# Patient Record
Sex: FEMALE | Race: BLACK OR AFRICAN AMERICAN | Employment: OTHER | URBAN - METROPOLITAN AREA
[De-identification: names, ages, dates, MRNs, and addresses within clinical notes are randomized per-mention and may not be internally consistent; named-entity substitution may affect disease eponyms.]

---

## 2022-03-30 ENCOUNTER — APPOINTMENT (OUTPATIENT)
Dept: CT IMAGING | Facility: CLINIC | Age: 72
End: 2022-03-30
Payer: MEDICARE

## 2022-03-30 ENCOUNTER — HOSPITAL ENCOUNTER (EMERGENCY)
Facility: CLINIC | Age: 72
Discharge: HOME | End: 2022-03-30
Attending: EMERGENCY MEDICINE
Payer: MEDICARE

## 2022-03-30 VITALS
DIASTOLIC BLOOD PRESSURE: 75 MMHG | OXYGEN SATURATION: 96 % | HEIGHT: 63 IN | SYSTOLIC BLOOD PRESSURE: 133 MMHG | RESPIRATION RATE: 21 BRPM | HEART RATE: 109 BPM | WEIGHT: 126 LBS | TEMPERATURE: 98.8 F | BODY MASS INDEX: 22.32 KG/M2

## 2022-03-30 DIAGNOSIS — K59.00 CONSTIPATION, UNSPECIFIED CONSTIPATION TYPE: Primary | ICD-10-CM

## 2022-03-30 LAB
ABSOLUTE EOS #: 1 K/UL (ref 0–0.4)
ABSOLUTE LYMPH #: 3 K/UL (ref 1–4.8)
ABSOLUTE MONO #: 0.4 K/UL (ref 0.1–1.2)
ALBUMIN SERPL-MCNC: 4 G/DL (ref 3.5–5.2)
ALBUMIN/GLOBULIN RATIO: 1.4 (ref 1–2.5)
ALP BLD-CCNC: 102 U/L (ref 35–104)
ALT SERPL-CCNC: 15 U/L (ref 5–33)
ANION GAP SERPL CALCULATED.3IONS-SCNC: 15 MMOL/L (ref 9–17)
AST SERPL-CCNC: 22 U/L
BASOPHILS # BLD: 1 % (ref 0–2)
BASOPHILS ABSOLUTE: 0.1 K/UL (ref 0–0.2)
BILIRUB SERPL-MCNC: 0.2 MG/DL (ref 0.3–1.2)
BILIRUBIN URINE: NEGATIVE
BUN BLDV-MCNC: 11 MG/DL (ref 8–23)
CALCIUM SERPL-MCNC: 8.3 MG/DL (ref 8.6–10.4)
CHLORIDE BLD-SCNC: 101 MMOL/L (ref 98–107)
CO2: 22 MMOL/L (ref 20–31)
COLOR: YELLOW
COMMENT UA: NORMAL
CREAT SERPL-MCNC: 1.1 MG/DL (ref 0.5–0.9)
EOSINOPHILS RELATIVE PERCENT: 18 % (ref 1–4)
GFR AFRICAN AMERICAN: 59 ML/MIN
GFR NON-AFRICAN AMERICAN: 49 ML/MIN
GFR SERPL CREATININE-BSD FRML MDRD: ABNORMAL ML/MIN/{1.73_M2}
GLUCOSE BLD-MCNC: 128 MG/DL (ref 70–99)
GLUCOSE URINE: NEGATIVE
HCT VFR BLD CALC: 35.1 % (ref 36–46)
HEMOGLOBIN: 11.6 G/DL (ref 12–16)
KETONES, URINE: NEGATIVE
LEUKOCYTE ESTERASE, URINE: NEGATIVE
LIPASE: 36 U/L (ref 13–60)
LYMPHOCYTES # BLD: 51 % (ref 24–44)
MCH RBC QN AUTO: 27.8 PG (ref 26–34)
MCHC RBC AUTO-ENTMCNC: 33 G/DL (ref 31–37)
MCV RBC AUTO: 84.2 FL (ref 80–100)
MONOCYTES # BLD: 7 % (ref 2–11)
NITRITE, URINE: NEGATIVE
PDW BLD-RTO: 13.7 % (ref 12.5–15.4)
PH UA: 7 (ref 5–8)
PLATELET # BLD: 274 K/UL (ref 140–450)
PMV BLD AUTO: 7.3 FL (ref 6–12)
POTASSIUM SERPL-SCNC: 3.7 MMOL/L (ref 3.7–5.3)
PROTEIN UA: NEGATIVE
RBC # BLD: 4.16 M/UL (ref 4–5.2)
SEG NEUTROPHILS: 23 % (ref 36–66)
SEGMENTED NEUTROPHILS ABSOLUTE COUNT: 1.3 K/UL (ref 1.8–7.7)
SODIUM BLD-SCNC: 138 MMOL/L (ref 135–144)
SPECIFIC GRAVITY UA: 1.01 (ref 1–1.03)
TOTAL PROTEIN: 6.8 G/DL (ref 6.4–8.3)
TROPONIN, HIGH SENSITIVITY: 7 NG/L (ref 0–14)
TURBIDITY: CLEAR
URINE HGB: NEGATIVE
UROBILINOGEN, URINE: NORMAL
WBC # BLD: 5.8 K/UL (ref 3.5–11)

## 2022-03-30 PROCEDURE — 85025 COMPLETE CBC W/AUTO DIFF WBC: CPT

## 2022-03-30 PROCEDURE — 99284 EMERGENCY DEPT VISIT MOD MDM: CPT

## 2022-03-30 PROCEDURE — 74177 CT ABD & PELVIS W/CONTRAST: CPT

## 2022-03-30 PROCEDURE — 81003 URINALYSIS AUTO W/O SCOPE: CPT

## 2022-03-30 PROCEDURE — 84484 ASSAY OF TROPONIN QUANT: CPT

## 2022-03-30 PROCEDURE — 36415 COLL VENOUS BLD VENIPUNCTURE: CPT

## 2022-03-30 PROCEDURE — 80053 COMPREHEN METABOLIC PANEL: CPT

## 2022-03-30 PROCEDURE — 93005 ELECTROCARDIOGRAM TRACING: CPT

## 2022-03-30 PROCEDURE — 83690 ASSAY OF LIPASE: CPT

## 2022-03-30 PROCEDURE — 70450 CT HEAD/BRAIN W/O DYE: CPT

## 2022-03-30 PROCEDURE — 6360000004 HC RX CONTRAST MEDICATION

## 2022-03-30 PROCEDURE — 2580000003 HC RX 258

## 2022-03-30 RX ORDER — ALENDRONATE SODIUM 70 MG/1
70 TABLET ORAL
COMMUNITY

## 2022-03-30 RX ORDER — POLYETHYLENE GLYCOL 3350 17 G/17G
17 POWDER, FOR SOLUTION ORAL DAILY
Qty: 510 G | Refills: 0 | Status: SHIPPED | OUTPATIENT
Start: 2022-03-30 | End: 2022-04-29

## 2022-03-30 RX ORDER — VENLAFAXINE HYDROCHLORIDE 37.5 MG/1
37.5 CAPSULE, EXTENDED RELEASE ORAL DAILY
COMMUNITY

## 2022-03-30 RX ORDER — DOCUSATE SODIUM 100 MG/1
100 CAPSULE, LIQUID FILLED ORAL 2 TIMES DAILY
Qty: 60 CAPSULE | Refills: 0 | Status: SHIPPED | OUTPATIENT
Start: 2022-03-30 | End: 2022-04-29

## 2022-03-30 RX ORDER — ESCITALOPRAM OXALATE 20 MG/1
20 TABLET ORAL DAILY
COMMUNITY
End: 2022-09-13

## 2022-03-30 RX ORDER — SODIUM CHLORIDE 0.9 % (FLUSH) 0.9 %
10 SYRINGE (ML) INJECTION PRN
Status: DISCONTINUED | OUTPATIENT
Start: 2022-03-30 | End: 2022-03-30 | Stop reason: HOSPADM

## 2022-03-30 RX ORDER — 0.9 % SODIUM CHLORIDE 0.9 %
70 INTRAVENOUS SOLUTION INTRAVENOUS ONCE
Status: COMPLETED | OUTPATIENT
Start: 2022-03-30 | End: 2022-03-30

## 2022-03-30 RX ORDER — AMITRIPTYLINE HYDROCHLORIDE 50 MG/1
50 TABLET, FILM COATED ORAL NIGHTLY
COMMUNITY

## 2022-03-30 RX ORDER — 0.9 % SODIUM CHLORIDE 0.9 %
500 INTRAVENOUS SOLUTION INTRAVENOUS ONCE
Status: COMPLETED | OUTPATIENT
Start: 2022-03-30 | End: 2022-03-30

## 2022-03-30 RX ORDER — SIMVASTATIN 20 MG
20 TABLET ORAL NIGHTLY
COMMUNITY

## 2022-03-30 RX ORDER — LANSOPRAZOLE 30 MG/1
30 CAPSULE, DELAYED RELEASE ORAL DAILY
COMMUNITY

## 2022-03-30 RX ADMIN — SODIUM CHLORIDE 70 ML: 9 INJECTION, SOLUTION INTRAVENOUS at 16:52

## 2022-03-30 RX ADMIN — Medication 10 ML: at 16:53

## 2022-03-30 RX ADMIN — IOPAMIDOL 75 ML: 755 INJECTION, SOLUTION INTRAVENOUS at 16:52

## 2022-03-30 RX ADMIN — SODIUM CHLORIDE 500 ML: 9 INJECTION, SOLUTION INTRAVENOUS at 16:05

## 2022-03-30 ASSESSMENT — PAIN DESCRIPTION - FREQUENCY: FREQUENCY: CONTINUOUS

## 2022-03-30 ASSESSMENT — PAIN DESCRIPTION - LOCATION: LOCATION: ABDOMEN

## 2022-03-30 ASSESSMENT — PAIN DESCRIPTION - PAIN TYPE: TYPE: ACUTE PAIN

## 2022-03-30 ASSESSMENT — PAIN SCALES - GENERAL: PAINLEVEL_OUTOF10: 5

## 2022-03-30 ASSESSMENT — PAIN DESCRIPTION - DESCRIPTORS: DESCRIPTORS: PRESSURE

## 2022-03-30 NOTE — ED PROVIDER NOTES
I was present in the ED during this patient's evaluation and management by the Advance Practice Provider and was available to address any concerns about their medical management.     Ap Woodard MD  Attending, Emergency Department      Farrukh Alonzo MD  03/30/22 6394

## 2022-03-30 NOTE — ED PROVIDER NOTES
[facility] ED  [address] [facility]  Phone: [phone]      eMERGENCY dEPARTMENT eNCOUnter      Pt Name: Mendoza Rousseau  MRN: [id number]  [place] 1950  Date of evaluation: 3/30/22      CHIEF COMPLAINT:  Chief Complaint   Patient presents with    Constipation    Loss of Consciousness       HISTORY OF PRESENT ILLNESS    Mendoza Rousseau is a 66 y.o. female who presents with complaints of constipation and syncopal episode. She reports that she has a history of a rectocele that causes intermittent constipation. She reports her last bowel movement was 6 days ago. She states that she has previously completed enemas at home with success and relief of constipation. Today she reports that she took a saline enema at home then went to lay down in her room to let the enema work. She states that she went into the restroom and had a small bowel movement although states that she felt like there was more stool in her rectum. She states that she went to lay down in approximately 20 minutes later got up to have another bowel movement. She states that she was straining and pushing and was unable to produce another bowel movement. She states that she started to feel abdominal cramping he got up to walk back to her bedroom to lay down when she had a syncopal episode falling and hitting her head on the wall. Her  states that he saw her laying in the hallway and helped her back to the bedroom, assisted her with getting dressed and brought her into the emergency room for an evaluation. She also reports that she was diagnosed with a urinary tract infection approximately 8 days ago at the [facility] urgent care and started on Bactrim. She states that she has 1 dose left to take although continues to feel urinary pressure. She denies any current complaints of fever, chills, nausea, vomiting, headache, neck pain, visual changes, dizziness, lightheadedness, and she denies the use of blood thinners. Nursing Notes were reviewed. REVIEW OF SYSTEMS       Constitutional: Denies recent fever, chills. Eyes: No visual changes. Neck: No neck pain. Respiratory: Denies recent shortness of breath. Cardiac:  Denies recent chest pain. GI: Complaining of constipation with lower abdominal pressure. Denies nausea or vomiting  : Denies dysuria. Musculoskeletal: Denies focal weakness. Neurologic: Denies headache or focal weakness. Skin:  Denies any rash. Negative in 10 essential Systems except as mentioned above and in the HPI. PAST MEDICAL HISTORY   PMH:  has a past medical history of Depression, Hypertension, Rectocele, and Uterine prolapse. Surgical History:  has no past surgical history on file. Social History:  reports that she has never smoked. She has never used smokeless tobacco. She reports previous alcohol use. She reports previous drug use. Family History: None  Psychiatric History: None  Allergies:is allergic to griseofulvin ultramicrosize [griseofulvin]. PHYSICAL EXAM     INITIAL VITALS: /75   Pulse 109   Temp 98.8 °F (37.1 °C) (Oral)   Resp 21   Ht 5' 3\" (1.6 m)   Wt 57.2 kg (126 lb)   SpO2 96%   BMI 22.32 kg/m²   Constitutional:  Well developed   Eyes:  Pupils equal/round  HENT:  Atraumatic, external ears normal, nose normal  Respiratory:  Clear to auscultation bilaterally with good air exchange, no W/R/R  Cardiovascular:  RRR with normal S1 and S2  Gastrointestinal/Abdomen:   Soft, suprapubic tenderness. BS present. No pulsatile masses palpated. Musculoskeletal:  Normal to inspection. Back:  No CVA tenderness. Integument:  No rash.     Neurologic:  Alert, appropriate mentation/interaction, no focal deficits noted       DIAGNOSTIC RESULTS     EKG: All EKG's are interpreted by the Emergency Department Physician who either signs or Co-signs this chart in the absence of a cardiologist.  Not indicated    RADIOLOGY:   Reviewed the radiologist:  CT ABDOMEN PELVIS W IV CONTRAST Additional Contrast? None   Final Result   The right kidney is mildly atrophic with multiple scars. There is moderate amount of fecal material in the colon suggesting   constipation. 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 CT HEAD WO CONTRAST   Final Result   No acute intracranial abnormality. CT HEAD WO CONTRAST    Result Date: 3/30/2022  EXAMINATION: CT OF THE HEAD WITHOUT CONTRAST  3/30/2022 4:45 pm TECHNIQUE: CT of the head was performed without the administration of intravenous contrast. Dose modulation, iterative reconstruction, and/or weight based adjustment of the mA/kV was utilized to reduce the radiation dose to as low as reasonably achievable. COMPARISON: None. HISTORY: ORDERING SYSTEM PROVIDED HISTORY: fall TECHNOLOGIST PROVIDED HISTORY: fall Decision Support Exception - unselect if not a suspected or confirmed emergency medical condition->Emergency Medical Condition (MA) Reason for Exam: fall, loc FINDINGS: BRAIN/VENTRICLES: There is no acute intracranial hemorrhage, mass effect or midline shift. No abnormal extra-axial fluid collection. The gray-white differentiation is maintained without evidence of an acute infarct. There is no evidence of hydrocephalus. ORBITS: The visualized portion of the orbits demonstrate no acute abnormality. SINUSES: The visualized paranasal sinuses and mastoid air cells demonstrate no acute abnormality. SOFT TISSUES/SKULL:  No acute abnormality of the visualized skull or soft tissues. No acute intracranial abnormality.      CT ABDOMEN PELVIS W IV CONTRAST Additional Contrast? None    Result Date: 3/30/2022  EXAMINATION: CT OF THE ABDOMEN AND PELVIS WITH CONTRAST 3/30/2022 4:44 pm TECHNIQUE: CT of the abdomen and pelvis was performed with the administration of intravenous contrast. Multiplanar reformatted images are provided for review. Dose modulation, iterative reconstruction, and/or weight based adjustment of the mA/kV was utilized to reduce the radiation dose to as low as reasonably achievable. COMPARISON: None. HISTORY: ORDERING SYSTEM PROVIDED HISTORY: abdominal pain/constipation TECHNOLOGIST PROVIDED HISTORY: abdominal pain/constipation Decision Support Exception - unselect if not a suspected or confirmed emergency medical condition->Emergency Medical Condition (MA) Reason for Exam: Lower abdominal pain, constipation with last BM x6 days ago, currently being treated for UTI. FINDINGS: Lower Chest: Unremarkable Organs: The liver, pancreas and spleen appear normal.  The patient is status post cholecystectomy. The left kidney appears normal.  There are scars noted in the right kidney. GI/Bowel: The stomach and small bowel loops appear normal.  There is moderate amount of fecal material in the colon, suggesting constipation. There is no bowel distension. Pelvis: Patient is status post hysterectomy. The bladder and the rectum appear normal.  There is evidence of chronic occlusion of the left external and common iliac veins, with associated varicosities noted along the anterior abdominal wall. Peritoneum/Retroperitoneum: Unremarkable Bones/Soft Tissues: Unremarkable     The right kidney is mildly atrophic with multiple scars. There is moderate amount of fecal material in the colon suggesting constipation. 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      LABS:  Labs Reviewed   CBC WITH AUTO DIFFERENTIAL - Abnormal; Notable for the following components:       Result Value    Hemoglobin 11.6 (*)     Hematocrit 35.1 (*)     Seg Neutrophils 23 (*)     Lymphocytes 51 (*)     Eosinophils % 18 (*)     Segs Absolute 1.30 (*)     Absolute Eos # 1.00 (*)     All other components within normal limits   COMPREHENSIVE METABOLIC PANEL - Abnormal; Notable for the following components:    Glucose 128 (*)     CREATININE 1.10 (*)     Calcium 8.3 (*)     Total Bilirubin 0.20 (*)     GFR Non- 49 (*)     GFR  59 (*)     All other components within normal limits   LIPASE   URINALYSIS WITH REFLEX TO CULTURE   TROPONIN     EMERGENCY DEPARTMENT COURSE:     Upon evaluation patient is resting on stretcher in no acute distress. Patient is alert and oriented. Plan is to obtain IV, administer IV fluids, labs to include CBC, CMP, lipase, troponin, EKG, CT head, and CT abdomen with IV contrast.     CBC, CMP, lipase are all stable and within acceptable limits. Troponin within normal limits. CT head without intracranial abnormality. Patient was up ambulatory to the restroom to obtain urinalysis. She denied any complaints of dizziness, lightheadedness or feelings of passing out. Patient with steady gait ambulatory back to treatment room. CT abdomen pelvis reveals the right kidney is mildly atrophic with multiple scars. There is moderate amount of fecal material in the colon suggesting constipation. 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 Discussed CT results with patient and she is in agreement with receiving a fleets enema here with magnesium citrate. Urinalysis within normal limits. Into discussed urinalysis results with patient and she is currently declining fleets enema or magnesium citrate as previously disscussed. We did discuss an alternative plan to take Metamucil every morning, Colace twice daily, and MiraLAX in the evening. She verbalized understanding and was in agreement with this plan. We did discuss returning to the emergency room further for any increase of abdominal pain, inability to pass gas or stool or development of fever.      The patient presents with abdominal pain without signs of peritonitis or other life-threatening or serious etiology. Re-evaluation of the abdomen is negative for guarding, peritoneal signs or significant tenderness noted. The patient appears stable for discharge and has been instructed to follow up with their primary care physician as soon as possible or to return immediately if the symptoms worsen in any way. The patient understands that at this time there is no evidence for a more malignant underlying process, but the patient also understands that early in the process of an illness or injury, an emergency department workup can be falsely reassuring. Routine discharge counseling was given, and the patient understands that worsening, changing or persistent symptoms should prompt an immediate call or follow up with their primary physician or return to the emergency department. The importance of appropriate follow up was also discussed. I have reviewed the disposition diagnosis with the patient and or their family/guardian. I have answered their questions and given discharge instructions. They voiced understanding of these instructions and did not have any further questions or complaints. Orders Placed This Encounter   Medications    0.9 % sodium chloride bolus    0.9 % sodium chloride bolus    sodium chloride flush 0.9 % injection 10 mL    iopamidol (ISOVUE-370) 76 % injection 75 mL    magnesium citrate solution 296 mL    polyethylene glycol (GLYCOLAX) 17 GM/SCOOP powder     Sig: Take 17 g by mouth daily     Dispense:  510 g     Refill:  0    docusate sodium (COLACE) 100 MG capsule     Sig: Take 1 capsule by mouth 2 times daily     Dispense:  60 capsule     Refill:  0       CONSULTS:  None      FINAL IMPRESSION      1.  Constipation, unspecified constipation type          DISPOSITION/PLAN:  DISPOSITION          PATIENT REFERRED TO:  Bienvenido Faye MD  [facility]  [address]  [phone]    Call in 2 days      [facility]  [address] [place] [address],[facility] [address]  [phone]    As needed      DISCHARGE MEDICATIONS:  New Prescriptions    DOCUSATE SODIUM (COLACE) 100 MG CAPSULE    Take 1 capsule by mouth 2 times daily    POLYETHYLENE GLYCOL (GLYCOLAX) 17 GM/SCOOP POWDER    Take 17 g by mouth daily       (Please note that portions of this note were completed with a voice recognition program.  Efforts were made to edit the dictations but occasionally words are mis-transcribed.)    ELIO Murray CNP, APRN - CNP  03/30/22 1320

## 2022-03-30 NOTE — ED NOTES
Pt presented to the ED c/o constipation and syncope. Pt states she has a Hx of rectocele and states she has not has a BM for about 6 days. Pt states she did an enema at home, and states she was able to get some stool out, but not all of it. Pt states she may have been straining when she had LOC. Pt states she hit the left side of her forehead on the area rug. Denies head and neck pain, denies visual changes, denies being on blood thinner. Pt is unsure how long she had LOC for. Pt states she is feeling weak and shaky at the moment. Reports she is having lower abdominal pressure, states she is being treated for a UTI with Bactrim and has one and a half days of pills left.       Mignon Hays RN  03/30/22 1600

## 2022-03-30 NOTE — ED NOTES
Pt up to bathroom with a steady gait, denies dizziness, urine specimen received, Canales April NP updated, continue to monitor     Gus Santana RN  03/30/22 7090

## 2022-03-31 LAB
EKG ATRIAL RATE: 109 BPM
EKG P AXIS: 60 DEGREES
EKG P-R INTERVAL: 154 MS
EKG Q-T INTERVAL: 352 MS
EKG QRS DURATION: 68 MS
EKG QTC CALCULATION (BAZETT): 474 MS
EKG R AXIS: 28 DEGREES
EKG T AXIS: 72 DEGREES
EKG VENTRICULAR RATE: 109 BPM

## 2022-06-06 ENCOUNTER — HOSPITAL ENCOUNTER (EMERGENCY)
Facility: CLINIC | Age: 72
Discharge: HOME | End: 2022-06-06
Attending: EMERGENCY MEDICINE
Payer: MEDICARE

## 2022-06-06 ENCOUNTER — APPOINTMENT (OUTPATIENT)
Dept: GENERAL RADIOLOGY | Facility: CLINIC | Age: 72
End: 2022-06-06
Payer: MEDICARE

## 2022-06-06 VITALS
DIASTOLIC BLOOD PRESSURE: 66 MMHG | WEIGHT: 127 LBS | HEIGHT: 63 IN | TEMPERATURE: 98.5 F | OXYGEN SATURATION: 97 % | SYSTOLIC BLOOD PRESSURE: 124 MMHG | HEART RATE: 72 BPM | BODY MASS INDEX: 22.5 KG/M2 | RESPIRATION RATE: 16 BRPM

## 2022-06-06 DIAGNOSIS — K59.00 CONSTIPATION, UNSPECIFIED CONSTIPATION TYPE: Primary | ICD-10-CM

## 2022-06-06 DIAGNOSIS — N81.6 RECTOCELE: ICD-10-CM

## 2022-06-06 LAB
-: ABNORMAL
BACTERIA: ABNORMAL
BILIRUBIN URINE: NEGATIVE
COLOR: YELLOW
EPITHELIAL CELLS UA: ABNORMAL /HPF (ref 0–5)
GLUCOSE URINE: NEGATIVE
KETONES, URINE: NEGATIVE
LEUKOCYTE ESTERASE, URINE: ABNORMAL
NITRITE, URINE: NEGATIVE
OTHER OBSERVATIONS UA: ABNORMAL
PH UA: 6 (ref 5–8)
PROTEIN UA: NEGATIVE
RBC UA: ABNORMAL /HPF (ref 0–2)
SPECIFIC GRAVITY UA: 1 (ref 1–1.03)
TURBIDITY: CLEAR
URINE HGB: NEGATIVE
UROBILINOGEN, URINE: NORMAL
WBC UA: ABNORMAL /HPF (ref 0–5)

## 2022-06-06 PROCEDURE — 6370000000 HC RX 637 (ALT 250 FOR IP): Performed by: EMERGENCY MEDICINE

## 2022-06-06 PROCEDURE — 81001 URINALYSIS AUTO W/SCOPE: CPT

## 2022-06-06 PROCEDURE — 99284 EMERGENCY DEPT VISIT MOD MDM: CPT

## 2022-06-06 PROCEDURE — 74022 RADEX COMPL AQT ABD SERIES: CPT

## 2022-06-06 RX ORDER — ONDANSETRON 2 MG/ML
4 INJECTION INTRAMUSCULAR; INTRAVENOUS ONCE
Status: DISCONTINUED | OUTPATIENT
Start: 2022-06-06 | End: 2022-06-06

## 2022-06-06 RX ORDER — 0.9 % SODIUM CHLORIDE 0.9 %
1000 INTRAVENOUS SOLUTION INTRAVENOUS ONCE
Status: DISCONTINUED | OUTPATIENT
Start: 2022-06-06 | End: 2022-06-06

## 2022-06-06 RX ORDER — KETOROLAC TROMETHAMINE 15 MG/ML
15 INJECTION, SOLUTION INTRAMUSCULAR; INTRAVENOUS ONCE
Status: DISCONTINUED | OUTPATIENT
Start: 2022-06-06 | End: 2022-06-06

## 2022-06-06 RX ADMIN — MAGNESIUM CITRATE 296 ML: 1.75 LIQUID ORAL at 23:06

## 2022-06-06 ASSESSMENT — PAIN - FUNCTIONAL ASSESSMENT: PAIN_FUNCTIONAL_ASSESSMENT: 0-10

## 2022-06-06 ASSESSMENT — PAIN SCALES - GENERAL: PAINLEVEL_OUTOF10: 7

## 2022-06-06 ASSESSMENT — PAIN DESCRIPTION - ORIENTATION: ORIENTATION: RIGHT;LEFT

## 2022-06-06 ASSESSMENT — PAIN DESCRIPTION - LOCATION: LOCATION: GROIN

## 2022-06-06 ASSESSMENT — PAIN DESCRIPTION - DESCRIPTORS: DESCRIPTORS: PRESSURE

## 2022-06-07 NOTE — ED NOTES
Pt presents to ED ambulatory a&o x4. Pt comes with complaints of burning with urination and constipation. Pt states she had a small BM today and now is only able to urinate small amounts at a time.       Angela Graff RN  06/06/22 1275

## 2022-06-07 NOTE — ED PROVIDER NOTES
[facility]  [address] [facility]  Phone: [phone]      Pt Name: Sugar Strickland  ATS:[id number]  [place] 1950  Date of evaluation: 6/6/2022      CHIEF COMPLAINT       Chief Complaint   Patient presents with    Dysuria       HISTORY OF PRESENT ILLNESS   Sugar Strickland is a 67 y.o. female who presents for evaluation of dysuria. She reports that she has history of a rectocele and chronic constipation. She states that starting tonight she developed gradual onset, constant, progressive, suprapubic pressure and is unsure if it is from her rectocele or from constipation. She normally takes MiraLAX, laxative, and fiber daily to help with constipation but has not taken this for the past 4 days because she wanted to see if she could wean herself off of it. The patient states that starting today she also had difficulty urinating with associated dysuria, urgency and frequency. She states that she was treated for UTI approximately 1 week ago with Bactrim. She last had a bowel movement this morning. The patient has not taken any medications for symptoms and does not list any provoking or palliating factors. She denies fever, chills, headache, vision changes, neck pain, back pain, chest pain, shortness of breath, abdominal pain, focal weakness, numbness, tingling, recent injury or illness. REVIEW OF SYSTEMS     Ten point review of systems was reviewed and is negative unless otherwise noted in the HPI    [address]    has a past medical history of Depression, Hypertension, Rectocele, and Uterine prolapse. SURGICAL HISTORY      has no past surgical history on file.   The patient denies    CURRENT MEDICATIONS       Discharge Medication List as of 6/6/2022 10:59 PM      CONTINUE these medications which have NOT CHANGED    Details   venlafaxine (EFFEXOR XR) 37.5 MG extended release capsule Take 37.5 mg by mouth dailyHistorical Med      amitriptyline (ELAVIL) 50 MG tablet Take 50 mg by mouth nightlyHistorical Med      lansoprazole (PREVACID) 30 MG delayed release capsule Take 30 mg by mouth dailyHistorical Med      escitalopram (LEXAPRO) 20 MG tablet Take 20 mg by mouth dailyHistorical Med      simvastatin (ZOCOR) 20 MG tablet Take 20 mg by mouth nightlyHistorical Med      alendronate (FOSAMAX) 70 MG tablet Take 70 mg by mouth every 7 daysHistorical Med             ALLERGIES     is allergic to griseofulvin ultramicrosize [griseofulvin] and tramadol. FAMILY HISTORY     has no family status information on file. family history is not on file. SOCIAL HISTORY      reports that she has never smoked. She has never used smokeless tobacco. She reports previous alcohol use. She reports previous drug use. PHYSICAL EXAM     INITIAL VITALS:  height is 5' 3\" (1.6 m) and weight is 57.6 kg (127 lb). Her oral temperature is 98.5 °F (36.9 °C). Her blood pressure is 124/66 and her pulse is 72. Her respiration is 16 and oxygen saturation is 97%. CONSTITUTIONAL: no apparent distress, well appearing  SKIN: warm, dry, no jaundice, hives or petechiae  EYES: clear conjunctiva, non-icteric sclera  HENT: normocephalic, atraumatic, moist mucus membranes  NECK: Nontender and supple with no nuchal rigidity, full range of motion  PULMONARY: clear to auscultation without wheezes, rhonchi, or rales, normal excursion, no accessory muscle use and no stridor  CARDIOVASCULAR: regular rate, rhythm. Strong radial pulses with intact distal perfusion. Capillary refill <2 seconds. GASTROINTESTINAL: soft, non-tender, no pain over McBurney's point, negative Huff sign, no pulsatile mass, non-distended, no palpable masses, no rebound or guarding   GENITOURINARY: No costovertebral angle tenderness to palpation  MUSCULOSKELETAL: No midline spinal tenderness, step off or deformity. Extremities are otherwise nontender to palpation and nonerythematous. Compartments soft. No peripheral edema.   NEUROLOGIC: alert and oriented x 3, GCS 15, normal mentation and speech. Moves all extremities x 4 without motor or sensory deficit, gait is stable without ataxia  PELVIC/RECTAL EXAM: Exam performed with nurse chaperon Rosa BYNUM. Rectocele visualized within the vaginal vault but it is not protruding outside of the vagina at this time. No external or internal hemorrhoids seen or palpated. Normal rectal tone and sensation. No gross blood. Hemoccult negative. No stool within the rectal vault. PSYCHIATRIC: normal mood and affect, thought process is clear and linear    DIAGNOSTIC RESULTS     EKG:  None    RADIOLOGY:   XR ACUTE ABD SERIES CHEST 1 VW    Result Date: 6/6/2022  EXAMINATION: TWO XRAY VIEWS OF THE ABDOMEN AND SINGLE  XRAY VIEW OF THE CHEST 6/6/2022 9:34 pm COMPARISON: CT abdomen/pelvis 03/30/2022 HISTORY: ORDERING SYSTEM PROVIDED HISTORY: constipation TECHNOLOGIST PROVIDED HISTORY: constipation Reason for Exam: dysuria FINDINGS: Heart size and configuration are normal.  Hilar and mediastinal structures are unremarkable. Mild elevation of the right hemidiaphragm. The lungs are clear. No pneumothorax or pleural fluid. No acute bone finding. There is a mild formed stool load. Bowel gas pattern is otherwise unremarkable. No abnormal calcifications or soft tissue masses. Right upper quadrant surgical clips from remote cholecystectomy. No acute bone finding. Mild formed stool load suggests constipation. Otherwise unremarkable abdominal examination. No acute cardiopulmonary disease.        LABS:  Results for orders placed or performed during the hospital encounter of 06/06/22   Urinalysis with Reflex to Culture    Specimen: Urine   Result Value Ref Range    Color, UA Yellow Yellow    Turbidity UA Clear Clear    Glucose, Ur NEGATIVE NEGATIVE    Bilirubin Urine NEGATIVE NEGATIVE    Ketones, Urine NEGATIVE NEGATIVE    Specific [place], UA 1.005 1.005 - 1.030    Urine Hgb NEGATIVE NEGATIVE    pH, UA 6.0 5.0 - 8.0    Protein, UA NEGATIVE NEGATIVE    Urobilinogen, Urine Normal Normal    Nitrite, Urine NEGATIVE NEGATIVE    Leukocyte Esterase, Urine SMALL (A) NEGATIVE   Microscopic Urinalysis   Result Value Ref Range    -          WBC, UA 2 TO 5 0 - 5 /HPF    RBC, UA 0 TO 2 0 - 2 /HPF    Epithelial Cells UA 2 TO 5 0 - 5 /HPF    Bacteria, UA FEW (A) None    Other Observations UA (A) NOT REQ. Utilizing a urinalysis as the only screening method to exclude a potential uropathogen can be unreliable in many patient populations. Rapid screening tests are less sensitive than culture and if UTI is a clinical possibility, culture should be considered despite a negative urinalysis. EMERGENCY DEPARTMENT COURSE:        The patient was given the following medications:  Orders Placed This Encounter   Medications    DISCONTD: ketorolac (TORADOL) injection 15 mg    DISCONTD: ondansetron (ZOFRAN) injection 4 mg    DISCONTD: 0.9 % sodium chloride bolus    magnesium citrate solution 296 mL        Vitals:    Vitals:    06/06/22 2111 06/06/22 2113   BP: 124/66    Pulse: 72    Resp: 16    Temp:  98.5 °F (36.9 °C)   TempSrc:  Oral   SpO2: 97%    Weight:  57.6 kg (127 lb)   Height:  5' 3\" (1.6 m)     -------------------------  BP: 124/66, Temp: 98.5 °F (36.9 °C), Heart Rate: 72, Resp: 16    CONSULTS:  None    CRITICAL CARE:   None    PROCEDURES:  None    DIAGNOSIS/ MDM:   Reg Sung is a 67 y.o. female who presents with suprapubic pressure and dysuria. Vital signs are stable. She does have a visible rectocele on exam but is not protruding outside of the vagina. Normal rectal exam without any stool palpable in the vault. Abdomen soft and nontender. Urinalysis shows small leukocytes and few bacteria but no other significant signs of infection. X-ray acute abdominal series shows mild formed stool load suggesting constipation.   I suspect the patient's symptoms are secondary to being off of her normal constipation meds for several days which has led to constipation. I also suspect she may be having irritation from her rectocele but I do not see any signs of infection or abscess. She is already established with gynecology, Dr. Shonna Nichole, and I recommended that she follow-up with them for further treatment of her rectocele. I gave the patient magnesium citrate to help stimulate a bowel movement and she does not want to wait to have a bowel movement in the ER. I instructed her to start her normal bowel regimen tomorrow and to follow-up with her PCP in 1 to 2 days. She was instructed to return to the ER for worsening symptoms or any other concern. I have low suspicion for acute abdomen, perforation or obstruction. The patient understands that at this time there is no evidence for a more malignant underlying process, but also understands that early in the process of an illness or injury, an emergency department work-up can be falsely reassuring. Routine discharge counseling was given, and the patient understands that worsening, changing or persistent symptoms should prompt a immediate call or follow-up with their primary care physician or return to the emergency department. The importance of appropriate follow-up was also discussed. I have reviewed the disposition diagnosis with the patient. I have answered their questions and given discharge instructions. They voiced understanding of these instructions and did not have any further questions or complaints. FINAL IMPRESSION      1. Constipation, unspecified constipation type    2.  Rectocele          DISPOSITION/PLAN   DISPOSITION          PATIENT REFERRED TO:  Rita Ramirez MD  [place] [address]  [place] [address] [phone]    Schedule an appointment as soon as possible for a visit in 2 days      [facility]  [address] [place] [address],[facility] [address]  [phone]  Go to   If symptoms worsen      DISCHARGE MEDICATIONS:  Discharge Medication List as of 6/6/2022 10:59 PM          (Please note that portions of this note were completed with a voice recognitionprogram.  Efforts were made to edit the dictations but occasionally words are mis-transcribed.)    Price Triana DO, [address] [facility]  Emergency Physician Attending         Price Triana DO  06/07/22 1088

## 2022-06-07 NOTE — ED NOTES
The following labs labeled with pt sticker and tubed to lab:     [] Blue     [] Lavender   [] on ice  [] Green/yellow  [] Green/black [] on ice  [] Yellow  [] Red  [] Pink      [] COVID-19 swab    [] Rapid  [] PCR  [] Flu swab  [] Peds Viral Panel     [x] Urine Sample  [] Pelvic Cultures  [] Blood Cultures          Cammy Beasley RN  06/06/22 3453

## 2022-09-13 ENCOUNTER — HOSPITAL ENCOUNTER (EMERGENCY)
Facility: CLINIC | Age: 72
Discharge: HOME | End: 2022-09-14
Attending: EMERGENCY MEDICINE
Payer: MEDICARE

## 2022-09-13 ENCOUNTER — APPOINTMENT (OUTPATIENT)
Dept: GENERAL RADIOLOGY | Facility: CLINIC | Age: 72
End: 2022-09-13
Payer: MEDICARE

## 2022-09-13 VITALS
SYSTOLIC BLOOD PRESSURE: 133 MMHG | BODY MASS INDEX: 22.5 KG/M2 | TEMPERATURE: 98.2 F | HEART RATE: 85 BPM | OXYGEN SATURATION: 97 % | DIASTOLIC BLOOD PRESSURE: 79 MMHG | WEIGHT: 127 LBS | RESPIRATION RATE: 16 BRPM

## 2022-09-13 DIAGNOSIS — K59.00 CONSTIPATION, UNSPECIFIED CONSTIPATION TYPE: Primary | ICD-10-CM

## 2022-09-13 PROCEDURE — 99283 EMERGENCY DEPT VISIT LOW MDM: CPT

## 2022-09-13 RX ORDER — FLUOXETINE 10 MG/1
10 TABLET, FILM COATED ORAL DAILY
COMMUNITY

## 2022-09-13 RX ORDER — DOCUSATE SODIUM 100 MG/1
100 CAPSULE, LIQUID FILLED ORAL 2 TIMES DAILY
COMMUNITY

## 2022-09-13 RX ORDER — PREDNISONE 20 MG/1
20 TABLET ORAL DAILY
COMMUNITY

## 2022-09-13 ASSESSMENT — PAIN - FUNCTIONAL ASSESSMENT: PAIN_FUNCTIONAL_ASSESSMENT: NONE - DENIES PAIN

## 2022-09-14 ENCOUNTER — APPOINTMENT (OUTPATIENT)
Dept: GENERAL RADIOLOGY | Facility: CLINIC | Age: 72
End: 2022-09-14
Payer: MEDICARE

## 2022-09-14 PROCEDURE — 74022 RADEX COMPL AQT ABD SERIES: CPT

## 2022-09-14 NOTE — ED NOTES
Pt. Had small amount of stools noted in bedside commode. Dr. Fawad Carreno notified.      Emre Mcqueen RN  09/14/22 0917

## 2022-09-14 NOTE — ED PROVIDER NOTES
[facility] ED  [address] [facility]  Phone: [phone]        ADDENDUM:      Care of this patient was assumed from Dr. Jody Leone. The patient was seen for Constipation (Hx rectocele constipation since Sunday a few hard stools yesterday and today.)  . The patient's initial evaluation and plan have been discussed with the prior provider who initially evaluated the patient. Nursing Notes, Past Medical Hx, Past Surgical Hx, Allergies, were all reviewed. PAST MEDICAL HISTORY    has a past medical history of Depression, Hypertension, Rectocele, and Uterine prolapse. SURGICAL HISTORY      has no past surgical history on file. CURRENT MEDICATIONS       Previous Medications    ALENDRONATE (FOSAMAX) 70 MG TABLET    Take 70 mg by mouth every 7 days    AMITRIPTYLINE (ELAVIL) 50 MG TABLET    Take 50 mg by mouth nightly    CHOLECALCIFEROL (VITAMIN D3) 125 MCG (5000 UT) TABS    Take by mouth    DOCUSATE SODIUM (COLACE) 100 MG CAPSULE    Take 100 mg by mouth 2 times daily    FLUOXETINE (PROZAC) 10 MG TABLET    Take 10 mg by mouth daily    LANSOPRAZOLE (PREVACID) 30 MG DELAYED RELEASE CAPSULE    Take 30 mg by mouth daily    LINACLOTIDE (LINZESS) 72 MCG CAPS CAPSULE    Take 72 mcg by mouth every morning (before breakfast)    PREDNISONE (DELTASONE) 20 MG TABLET    Take 20 mg by mouth daily    SIMVASTATIN (ZOCOR) 20 MG TABLET    Take 20 mg by mouth nightly    VENLAFAXINE (EFFEXOR XR) 37.5 MG EXTENDED RELEASE CAPSULE    Take 37.5 mg by mouth daily       ALLERGIES     is allergic to griseofulvin ultramicrosize [griseofulvin] and tramadol. Diagnostic Results     LABS:   No results found for this visit on 09/13/22. RADIOLOGY:  XR ACUTE ABD SERIES CHEST 1 VW   Preliminary Result   Nonspecific bowel gas pattern. No obvious findings of intestinal obstruction   or ileus. Paralleling the chest x-ray today no acute process. No pneumoperitoneum.              RECENT VITALS:  BP: 133/79, Temp: 98.2

## 2022-09-14 NOTE — ED NOTES
Pt. Ambulatory to restroom. Pt. Had small amount of soft stools noted in toilet.      Odalys Benjamin RN  09/14/22 8706

## 2022-09-14 NOTE — DISCHARGE INSTRUCTIONS
Take your medication as indicated and prescribed. Take either milk of magnesium or 1/2 bottle of magnesium citrate if no bowel movement. Eat foods high in fiber, or start using fiber supplements. PLEASE RETURN TO THE EMERGENCY DEPARTMENT IMMEDIATELY for worsening symptoms, inability to have a bowel movement or if you develop any concerning symptoms such as: high fever not relieved by acetaminophen (Tylenol) and/or ibuprofen (Motrin / Advil), chills, shortness of breath, chest pain, feeling of your heart fluttering or racing, persistent nausea and/or vomiting, vomiting up blood, blood in your stool, numbness, loss of consciousness, weakness or tingling in the arms or legs or change in color of the extremities, changes in mental status, persistent headache, blurry vision, loss of bladder / bowel control, unable to follow up with your physician, or other any other care or concern.

## 2022-09-14 NOTE — ED NOTES
Writer giving pt. Milk of molasses enema. Pt. Received small amount and states her stomach is cramping. Pt.  Informed writer will wait 10 minutes then re-eval.     Flint Schilder, RN  09/14/22 0151

## 2022-09-27 ASSESSMENT — ENCOUNTER SYMPTOMS
SHORTNESS OF BREATH: 0
NAUSEA: 0
CONSTIPATION: 1
ABDOMINAL PAIN: 1
COUGH: 0
EYE DISCHARGE: 0
VOMITING: 0
EYE REDNESS: 0
DIARRHEA: 0
COLOR CHANGE: 0
EYE PAIN: 0
STRIDOR: 0
SORE THROAT: 0
WHEEZING: 0

## 2022-09-27 NOTE — ED PROVIDER NOTES
[address] ED  EMERGENCY DEPARTMENT ENCOUNTER      Pt Name: Seamus Varghese  MRN: [id number]  [place] 1950  Date of evaluation: 9/13/2022  Provider: Binnie Cranker, MD    CHIEF COMPLAINT       Chief Complaint   Patient presents with    Constipation     Hx rectocele constipation since Sunday a few hard stools yesterday and today. HISTORY OF PRESENT ILLNESS  (Location/Symptom, Timing/Onset, Context/Setting, Quality, Duration, Modifying Factors, Severity.)   Seamus Varghese is a 67 y.o. female who presents to the emergency department complaining of constipation and history of rectocele. She has been really nervous to push too hard because of the history of her rectocele. She has had constipation since Sunday with a few hard stools yesterday and today. She has had no nausea or vomiting. No problems with urination. Nursing Notes were reviewed. REVIEW OF SYSTEMS    (2-9 systems for level 4, 10 or more for level 5)     Review of Systems   Constitutional:  Negative for activity change, appetite change, chills, fatigue and fever. HENT:  Negative for congestion, ear pain and sore throat. Eyes:  Negative for pain, discharge and redness. Respiratory:  Negative for cough, shortness of breath, wheezing and stridor. Cardiovascular:  Negative for chest pain. Gastrointestinal:  Positive for abdominal pain and constipation. Negative for diarrhea, nausea and vomiting. Genitourinary:  Negative for decreased urine volume and difficulty urinating. Musculoskeletal:  Negative for arthralgias and myalgias. Skin:  Negative for color change and rash. Neurological:  Negative for dizziness, weakness and headaches. Psychiatric/Behavioral:  Negative for behavioral problems and confusion. Except as noted above the remainder of the review of systems was reviewed and negative.        PAST MEDICAL HISTORY     Past Medical History:   Diagnosis Date    Depression     Hypertension     Rectocele Uterine prolapse        SURGICAL HISTORY     History reviewed. No pertinent surgical history. CURRENT MEDICATIONS       Discharge Medication List as of 9/14/2022  2:32 AM        CONTINUE these medications which have NOT CHANGED    Details   linaCLOtide (LINZESS) 72 MCG CAPS capsule Take 72 mcg by mouth every morning (before breakfast)Historical Med      predniSONE (DELTASONE) 20 MG tablet Take 20 mg by mouth dailyHistorical Med      FLUoxetine (PROZAC) 10 MG tablet Take 10 mg by mouth dailyHistorical Med      docusate sodium (COLACE) 100 MG capsule Take 100 mg by mouth 2 times dailyHistorical Med      Cholecalciferol (VITAMIN D3) 125 MCG (5000 UT) TABS Take by mouthHistorical Med      venlafaxine (EFFEXOR XR) 37.5 MG extended release capsule Take 37.5 mg by mouth dailyHistorical Med      amitriptyline (ELAVIL) 50 MG tablet Take 50 mg by mouth nightlyHistorical Med      lansoprazole (PREVACID) 30 MG delayed release capsule Take 30 mg by mouth dailyHistorical Med      simvastatin (ZOCOR) 20 MG tablet Take 20 mg by mouth nightlyHistorical Med      alendronate (FOSAMAX) 70 MG tablet Take 70 mg by mouth every 7 daysHistorical Med             ALLERGIES     Griseofulvin ultramicrosize [griseofulvin] and Tramadol    FAMILY HISTORY     History reviewed. No pertinent family history. No family status information on file. SOCIAL HISTORY      reports that she has never smoked. She has never used smokeless tobacco. She reports that she does not currently use alcohol. She reports that she does not currently use drugs. PHYSICAL EXAM    (up to 7 for level 4, 8 or more for level 5)     ED Triage Vitals [09/13/22 2347]   BP Temp Temp Source Heart Rate Resp SpO2 Height Weight   133/79 98.2 °F (36.8 °C) Oral 85 16 97 % -- 127 lb (57.6 kg)     Physical Exam  Vitals and nursing note reviewed. Constitutional:       General: She is in acute distress. Appearance: She is well-developed.  She is not ill-appearing, toxic-appearing or diaphoretic. HENT:      Head: Normocephalic and atraumatic. Right Ear: External ear normal.      Left Ear: External ear normal.      Nose: Nose normal.      Mouth/Throat:      Mouth: Mucous membranes are moist.   Eyes:      General:         Right eye: No discharge. Left eye: No discharge. Conjunctiva/sclera: Conjunctivae normal.      Pupils: Pupils are equal, round, and reactive to light. Cardiovascular:      Rate and Rhythm: Normal rate and regular rhythm. Heart sounds: Normal heart sounds. No murmur heard. Pulmonary:      Effort: Pulmonary effort is normal. No respiratory distress. Breath sounds: Normal breath sounds. No wheezing, rhonchi or rales. Chest:      Chest wall: No tenderness. Abdominal:      General: Bowel sounds are normal. There is no distension. Palpations: Abdomen is soft. There is no mass. Tenderness: There is no abdominal tenderness. There is no guarding or rebound. Hernia: No hernia is present. Musculoskeletal:         General: Normal range of motion. Cervical back: Normal range of motion and neck supple. Right lower leg: No edema. Left lower leg: No edema. Lymphadenopathy:      Cervical: No cervical adenopathy. Skin:     General: Skin is warm. Findings: No rash. Neurological:      General: No focal deficit present. Mental Status: She is alert and oriented to person, place, and time. Motor: No abnormal muscle tone.    Psychiatric:         Mood and Affect: Mood normal.         Behavior: Behavior normal.        DIAGNOSTIC RESULTS     EKG: All EKG's are interpreted by the Emergency Department Physician who either signs or Co-signs this chart in the absence of a cardiologist.    none    RADIOLOGY:   Non-plain film images such as CT, Ultrasound and MRI are read by the radiologist.   Interpretation per the Radiologist below, if available at the time of this note:    XR ACUTE ABD SERIES CHEST 1 VW Final Result   Nonspecific bowel gas pattern. No obvious findings of intestinal obstruction   or ileus. On the chest x-ray there is no acute process. No pneumoperitoneum. ED BEDSIDE ULTRASOUND:   Performed by ED Physician - none    LABS:  Labs Reviewed - No data to display    All other labs were within normal range or not returned as of this dictation. EMERGENCY DEPARTMENT COURSE and DIFFERENTIAL DIAGNOSIS/MDM:   Vitals:    Vitals:    09/13/22 2347   BP: 133/79   Pulse: 85   Resp: 16   Temp: 98.2 °F (36.8 °C)   TempSrc: Oral   SpO2: 97%   Weight: 127 lb (99.7 kg)     Certainly concern for constipation and we do not want to cause concern of rectocele. We did discuss getting x-ray to see what things look like inside the bowel and she is agreeable. Patient has been signed out to oncoming physician. CONSULTS:  None    PROCEDURES:  None    FINAL IMPRESSION      1.  Constipation, unspecified constipation type          DISPOSITION/PLAN   DISPOSITION Decision To Discharge 09/14/2022 02:27:15 AM      PATIENT REFERRED TO:  Rangel Painter MD  [address], [address]  [place] [address] [phone]    Schedule an appointment as soon as possible for a visit in 2 days      [facility]  [address] [place] [id number] [phone]  Go to   If symptoms worsen      DISCHARGE MEDICATIONS:  Discharge Medication List as of 9/14/2022  2:32 AM          (Please note that portions of this note were completed with a voice recognition program.  Efforts were made to edit the dictations but occasionally words are mis-transcribed.)    Maxine Joyner MD  Attending Emergency Physician       Maxine Joyner MD  09/27/22 0591